# Patient Record
Sex: FEMALE | Race: ASIAN | NOT HISPANIC OR LATINO | Employment: STUDENT | URBAN - METROPOLITAN AREA
[De-identification: names, ages, dates, MRNs, and addresses within clinical notes are randomized per-mention and may not be internally consistent; named-entity substitution may affect disease eponyms.]

---

## 2018-07-12 ENCOUNTER — OFFICE VISIT - HEALTHEAST (OUTPATIENT)
Dept: FAMILY MEDICINE | Facility: CLINIC | Age: 18
End: 2018-07-12

## 2018-07-12 DIAGNOSIS — L23.2 ALLERGIC CONTACT DERMATITIS DUE TO COSMETICS: ICD-10-CM

## 2020-06-19 ENCOUNTER — OFFICE VISIT - HEALTHEAST (OUTPATIENT)
Dept: FAMILY MEDICINE | Facility: CLINIC | Age: 20
End: 2020-06-19

## 2020-06-19 DIAGNOSIS — H61.22 CERUMINOSIS, LEFT: ICD-10-CM

## 2020-06-19 DIAGNOSIS — H93.8X2 EAR CANAL MASS, LEFT: ICD-10-CM

## 2020-06-19 DIAGNOSIS — H66.002 NON-RECURRENT ACUTE SUPPURATIVE OTITIS MEDIA OF LEFT EAR WITHOUT SPONTANEOUS RUPTURE OF TYMPANIC MEMBRANE: ICD-10-CM

## 2020-06-24 ENCOUNTER — COMMUNICATION - HEALTHEAST (OUTPATIENT)
Dept: ADMINISTRATIVE | Facility: CLINIC | Age: 20
End: 2020-06-24

## 2021-06-01 VITALS — WEIGHT: 81 LBS

## 2021-06-04 VITALS
SYSTOLIC BLOOD PRESSURE: 110 MMHG | OXYGEN SATURATION: 98 % | RESPIRATION RATE: 14 BRPM | DIASTOLIC BLOOD PRESSURE: 75 MMHG | TEMPERATURE: 98.7 F | HEART RATE: 104 BPM | WEIGHT: 88.2 LBS

## 2021-06-18 NOTE — PATIENT INSTRUCTIONS - HE
Patient Instructions by Grazyna Pelaez CNP at 6/19/2020 12:00 PM     Author: Grazyna Pelaez CNP Service: -- Author Type: Nurse Practitioner    Filed: 6/19/2020 12:43 PM Encounter Date: 6/19/2020 Status: Signed    : Grazyna Pelaez CNP (Nurse Practitioner)         Patient Education     Middle Ear Infection (Adult)  You have an infection of the middle ear, the space behind the eardrum. This is also called acute otitis media (AOM). Sometimes it is caused by the common cold. This is because congestion can block the internal passage (eustachian tube) that drains fluid from the middle ear. When the middle ear fills with fluid, bacteria can grow there and cause an infection. Oral antibiotics are used to treat this illness, not ear drops. Symptoms usually start to improve within 1 to 2 days of treatment.    Home care  The following are general care guidelines:    Finish all of the antibiotic medicine given, even though you may feel better after the first few days.    You may use over-the-counter medicine, such as acetaminophen or ibuprofen, to control pain and fever, unless something else was prescribed. If you have chronic liver or kidney disease or have ever had a stomach ulcer or gastrointestinal bleeding, talk with your healthcare provider before using these medicines. Do not give aspirin to anyone under 18 years of age who has a fever. It may cause severe illness or death.  Follow-up care  Follow up with your healthcare provider, or as advised, in 2 weeks if all symptoms have not gotten better, or if hearing doesn't go back to normal within 1 month.  When to seek medical advice  Call your healthcare provider right away if any of these occur:    Ear pain gets worse or does not improve after 3 days of treatment    Unusual drowsiness or confusion    Neck pain, stiff neck, or headache    Fluid or blood draining from the ear canal    Fever of 100.4 F (38 C) or as advised     Seizure  Date Last Reviewed:  6/1/2016 2000-2017 [employer]. [address] [place] [address], [place] [address]. All rights reserved. This information is not intended as a substitute for professional medical care. Always follow your healthcare professional's instructions.           Patient Education     Impacted Earwax     Inner ear structures including ear canal and eardrum.     Impacted earwax is a buildup of the natural wax in the ear (cerumen). Impacted earwax is very common. It can cause symptoms such as hearing loss. It can also make it difficult for a doctor to examine your ear.  Understanding earwax  Tiny glands in your ear make substances that combine with dead skin cells to form earwax. Earwax helps protect your ear canal from water, dirt, infection, and injury. Over time, earwax travels from the inner part of your ear canal to the entrance of the canal. Then it falls away naturally. But in some cases, it cant travel to the entrance of the canal. This may be because of a health condition or objects put in the ear. With age, earwax tends to become harder and less fluid. Older adults are more likely to have problems with earwax buildup.  What causes impacted earwax?  Earwax can build up because of many health conditions. Some cause a physical blockage. Others cause too much earwax to be made. Health conditions that can cause earwax buildup include:    Use of cotton swabs to clean deep in the ear canal    Bony blockage in the ear (osteoma or exostoses)    Infections, such as  infection of the outer ear (external otitis)    Skin disease, such as eczema    Autoimmune diseases, such as lupus    A narrowed ear canal from birth, chronic inflammation, or injury    Too much earwax because of injury    Too much earwax because of  water in the ear canal  Objects repeatedly placed in the ear can also cause impacted earwax. For example, putting cotton swabs in the ear may push the wax deeper into the ear. Over time, this may cause blockage.  Hearing aids, swimming plugs, and swim molds can cause the same problem when used again and again.  In some cases, the cause of impacted earwax is not known.  Symptoms of impacted earwax  Excess earwax usually does not cause any symptoms, unless there is a large amount of buildup. Then it may cause symptoms such as:    Hearing loss    Earache    Sense of ear fullness    Itching in the ear    Odor from the ear    Ear drainage    Dizziness    Ringing in the ears    Cough  Treatment for impacted earwax  If you dont have symptoms, you may not need treatment. Often, the earwax goes away on its own with time. If you have symptoms, you may have one or more treatments such as:    Eardrops to soften the earwax. This helps it leave the ear over time.    Rinsing (irrigation) of the ear canal with water. This is done in a doctors office.    Removal of the earwax with small tools. This is also done in a doctors office.  In rare cases, some treatments for earwax removal may cause complications such as:    Infection of the outer ear (otitis external)    Earache    Short-term hearing loss    Dizziness    Water trapped in the ear canal    Hole in the eardrum    Ringing in the ears    Bleeding from the ear  Talk with your healthcare provider about which risks apply most to you.  Healthcare providers do not advise use of ear candles or ear vacuum kits. These methods are not shown to work and may cause problems.  Preventing impacted earwax  You may not be able to prevent impacted earwax if you have a health condition that causes it, such as eczema. In other cases, you may be able to prevent earwax buildup by:    Using ear drops once a week    Having routine cleaning of the ear about every 6 months    Not using cotton swabs in the ear  Call your healthcare provider if you have symptoms of impacted earwax. Also call right away if you have severe symptoms after earwax removal. These may include bleeding or severe ear pain.  Date Last  Reviewed: 5/1/2017 2000-2019 [id number]. [address] [place] [address], [place] [address]. All rights reserved. This information is not intended as a substitute for professional medical care. Always follow your healthcare professional's instructions.

## 2021-06-19 NOTE — PROGRESS NOTES
Subjective:      Patient ID: Genevieve Echols is a 18 y.o. female.    Chief Complaint:    HPI Genevieve Echols is a 18 y.o. female who presents today complaining of facial swelling that she woke up with this morning.  Patient believes that it is the lotion that she put on her face the night before.  She remits some itching around her eyes.      Social History   Substance Use Topics     Smoking status: Never Smoker     Smokeless tobacco: Never Used     Alcohol use None       Review of Systems   Constitutional: Negative for fever.   Skin: Positive for rash.       Objective:     /64  Pulse 94  Temp 97.4  F (36.3  C) (Oral)   Resp 14  Wt (!) 81 lb (36.7 kg)  LMP 06/20/2018 (Approximate)  SpO2 97%  Breastfeeding? No    Physical Exam   Constitutional: She appears well-developed and well-nourished. No distress.   HENT:   Head: Normocephalic and atraumatic.   Right Ear: External ear normal.   Left Ear: External ear normal.   Eyes: Conjunctivae are normal.   Pulmonary/Chest: Effort normal. No respiratory distress.   Skin: Rash noted. She is not diaphoretic.   Erythema around the maxillary region and eyes.  The erythema is mild and there is slight amount of edema.  There is no peeling or blistering of the skin.   Psychiatric: She has a normal mood and affect. Her behavior is normal. Judgment and thought content normal.   Nursing note and vitals reviewed.    Clinical Decision Making:  Suspect contact dermatitis secondary to overlay face cream.  Recommend discontinuing the use of that cream.  Patient was prescribed Zyrtec and short burst of steroid to bring down facial swelling.  No throat or lung involvement currently.  Recommend ice packs for itch relief also.    Assessment:     Procedures    1. Allergic contact dermatitis due to cosmetics  predniSONE (DELTASONE) 20 MG tablet    cetirizine (ZYRTEC) 10 MG tablet         Patient Instructions   1.  Avoid using the only face cream again.  2.  Ice the skin  to bring down swelling and help with itch relief.  3.  Begin taking Zyrtec 1 tablet daily until symptoms resolve.  4.  Begin taking prednisone 2 tablets daily for a total of 4 days.  5.  Follow-up if you are not having any improvement over the course the next 5 days.  6.  Seek emergency medical attention if you have any difficulty breathing or swallowing.

## 2021-06-29 NOTE — PROGRESS NOTES
Progress Notes by Grazyna Pelaez CNP at 6/19/2020 12:00 PM     Author: Grazyna Pelaez CNP Service: -- Author Type: Nurse Practitioner    Filed: 6/19/2020  1:36 PM Encounter Date: 6/19/2020 Status: Signed    : Grazyna Pelaez CNP (Nurse Practitioner)       Chief Complaint   Patient presents with   ? Ear Pain     Left ear pain x3 days       HPI:  Genevieve Echols is a 20 y.o. female who presents today complaining of ongoing, LEFT ear pain for the past 3 days. Patient reports intermittent rhinorrhea, otherwise no other symptoms. Patient also notes a mole in her left ear canal, requests a referral to specialist for removal evaluation. Reports taking no OTC medications for her ear pain.     History obtained from the patient.  LMP: 06/16/2020      Problem List:  There are no relevant problems documented for this patient.      History reviewed. No pertinent past medical history.    Social History     Tobacco Use   ? Smoking status: Never Smoker   ? Smokeless tobacco: Never Used   Substance Use Topics   ? Alcohol use: Not on file       Review of Systems   Constitutional: Negative for activity change, appetite change, chills, fatigue and fever.   HENT: Positive for ear pain and rhinorrhea. Negative for congestion.    Respiratory: Negative for cough, shortness of breath and wheezing.    Gastrointestinal: Negative for diarrhea.   Genitourinary: Negative for dysuria.   All other systems reviewed and are negative.      Vitals:    06/19/20 1206   BP: 110/75   Pulse: (!) 104   Resp: 14   Temp: 98.7  F (37.1  C)   TempSrc: Oral   SpO2: 98%   Weight: (!) 88 lb 3.2 oz (40 kg)       Physical Exam  Constitutional:       Appearance: Normal appearance. She is not ill-appearing or diaphoretic.   HENT:      Head: Normocephalic.      Right Ear: Tympanic membrane, ear canal and external ear normal.      Left Ear: Tympanic membrane normal.      Ears:      Comments: LEFT TM not immediately visualized due to soft  ceruminosis, Ear irrigation completed by MA with improved visualization.     LEFT TM with fluid and erythemic, canal with small, mole like mass present, non-tender.       Nose: Congestion and rhinorrhea present.      Mouth/Throat:      Mouth: Mucous membranes are moist.      Pharynx: No oropharyngeal exudate or posterior oropharyngeal erythema.   Neck:      Musculoskeletal: Neck supple.   Cardiovascular:      Rate and Rhythm: Normal rate and regular rhythm.      Pulses: Normal pulses.      Heart sounds: Normal heart sounds.   Pulmonary:      Effort: Pulmonary effort is normal.      Breath sounds: Normal breath sounds.   Lymphadenopathy:      Cervical: Cervical adenopathy present.   Skin:     General: Skin is warm.      Capillary Refill: Capillary refill takes less than 2 seconds.      Coloration: Skin is not pale.      Findings: No rash.   Neurological:      General: No focal deficit present.      Mental Status: She is alert and oriented to person, place, and time. Mental status is at baseline.   Psychiatric:         Mood and Affect: Mood normal.         Behavior: Behavior normal.         No notes on file    Labs:  No results found for this or any previous visit (from the past 72 hour(s)).    Radiology: None noted    Clinical Decision Making: At the end of the encounter, I discussed results, diagnosis, medications. Discussed with patient ear infection and need for completion of full antibiotic course and when to return to clinic for follow up if no improvement. ENT referral provided as well for ear canal mass present. Patient understood and agreed to plan.       MASTER Hollingsworth, CNP       1. Ceruminosis, left  Ear cerumen removal   2. Ear canal mass, left  Ambulatory referral to ENT   3. Non-recurrent acute suppurative otitis media of left ear without spontaneous rupture of tympanic membrane  amoxicillin (AMOXIL) 875 MG tablet         Patient Instructions       Patient Education     Middle Ear Infection  (Adult)  You have an infection of the middle ear, the space behind the eardrum. This is also called acute otitis media (AOM). Sometimes it is caused by the common cold. This is because congestion can block the internal passage (eustachian tube) that drains fluid from the middle ear. When the middle ear fills with fluid, bacteria can grow there and cause an infection. Oral antibiotics are used to treat this illness, not ear drops. Symptoms usually start to improve within 1 to 2 days of treatment.    Home care  The following are general care guidelines:    Finish all of the antibiotic medicine given, even though you may feel better after the first few days.    You may use over-the-counter medicine, such as acetaminophen or ibuprofen, to control pain and fever, unless something else was prescribed. If you have chronic liver or kidney disease or have ever had a stomach ulcer or gastrointestinal bleeding, talk with your healthcare provider before using these medicines. Do not give aspirin to anyone under 18 years of age who has a fever. It may cause severe illness or death.  Follow-up care  Follow up with your healthcare provider, or as advised, in 2 weeks if all symptoms have not gotten better, or if hearing doesn't go back to normal within 1 month.  When to seek medical advice  Call your healthcare provider right away if any of these occur:    Ear pain gets worse or does not improve after 3 days of treatment    Unusual drowsiness or confusion    Neck pain, stiff neck, or headache    Fluid or blood draining from the ear canal    Fever of 100.4 F (38 C) or as advised     Seizure  Date Last Reviewed: 6/1/2016 2000-2017 [employer]. [address] [place] [address], [place] [address]. All rights reserved. This information is not intended as a substitute for professional medical care. Always follow your healthcare professional's instructions.           Patient Education     Impacted Earwax     Inner ear structures  including ear canal and eardrum.     Impacted earwax is a buildup of the natural wax in the ear (cerumen). Impacted earwax is very common. It can cause symptoms such as hearing loss. It can also make it difficult for a doctor to examine your ear.  Understanding earwax  Tiny glands in your ear make substances that combine with dead skin cells to form earwax. Earwax helps protect your ear canal from water, dirt, infection, and injury. Over time, earwax travels from the inner part of your ear canal to the entrance of the canal. Then it falls away naturally. But in some cases, it cant travel to the entrance of the canal. This may be because of a health condition or objects put in the ear. With age, earwax tends to become harder and less fluid. Older adults are more likely to have problems with earwax buildup.  What causes impacted earwax?  Earwax can build up because of many health conditions. Some cause a physical blockage. Others cause too much earwax to be made. Health conditions that can cause earwax buildup include:    Use of cotton swabs to clean deep in the ear canal    Bony blockage in the ear (osteoma or exostoses)    Infections, such as  infection of the outer ear (external otitis)    Skin disease, such as eczema    Autoimmune diseases, such as lupus    A narrowed ear canal from birth, chronic inflammation, or injury    Too much earwax because of injury    Too much earwax because of  water in the ear canal  Objects repeatedly placed in the ear can also cause impacted earwax. For example, putting cotton swabs in the ear may push the wax deeper into the ear. Over time, this may cause blockage. Hearing aids, swimming plugs, and swim molds can cause the same problem when used again and again.  In some cases, the cause of impacted earwax is not known.  Symptoms of impacted earwax  Excess earwax usually does not cause any symptoms, unless there is a large amount of buildup. Then it may cause symptoms such  as:    Hearing loss    Earache    Sense of ear fullness    Itching in the ear    Odor from the ear    Ear drainage    Dizziness    Ringing in the ears    Cough  Treatment for impacted earwax  If you dont have symptoms, you may not need treatment. Often, the earwax goes away on its own with time. If you have symptoms, you may have one or more treatments such as:    Eardrops to soften the earwax. This helps it leave the ear over time.    Rinsing (irrigation) of the ear canal with water. This is done in a doctors office.    Removal of the earwax with small tools. This is also done in a doctors office.  In rare cases, some treatments for earwax removal may cause complications such as:    Infection of the outer ear (otitis external)    Earache    Short-term hearing loss    Dizziness    Water trapped in the ear canal    Hole in the eardrum    Ringing in the ears    Bleeding from the ear  Talk with your healthcare provider about which risks apply most to you.  Healthcare providers do not advise use of ear candles or ear vacuum kits. These methods are not shown to work and may cause problems.  Preventing impacted earwax  You may not be able to prevent impacted earwax if you have a health condition that causes it, such as eczema. In other cases, you may be able to prevent earwax buildup by:    Using ear drops once a week    Having routine cleaning of the ear about every 6 months    Not using cotton swabs in the ear  Call your healthcare provider if you have symptoms of impacted earwax. Also call right away if you have severe symptoms after earwax removal. These may include bleeding or severe ear pain.  Date Last Reviewed: 5/1/2017 2000-2019 [employer]. [address] [place] [address], [place] [address]. All rights reserved. This information is not intended as a substitute for professional medical care. Always follow your healthcare professional's instructions.

## 2021-07-13 ENCOUNTER — OFFICE VISIT (OUTPATIENT)
Dept: FAMILY MEDICINE | Facility: CLINIC | Age: 21
End: 2021-07-13
Payer: COMMERCIAL

## 2021-07-13 VITALS
OXYGEN SATURATION: 98 % | HEART RATE: 80 BPM | SYSTOLIC BLOOD PRESSURE: 100 MMHG | RESPIRATION RATE: 12 BRPM | DIASTOLIC BLOOD PRESSURE: 66 MMHG | TEMPERATURE: 98.4 F

## 2021-07-13 DIAGNOSIS — D23.22: Primary | ICD-10-CM

## 2021-07-13 PROCEDURE — 99213 OFFICE O/P EST LOW 20 MIN: CPT | Performed by: PHYSICIAN ASSISTANT

## 2021-07-13 NOTE — PATIENT INSTRUCTIONS
Patient was educated on the natural course of condition. Referred to ENT for growth in left ear canal. Conservative measures discussed including avoid using q-tips and over-the-counter analgesics (Tylenol or Ibuprofen). See your primary care provider if symptoms worsen or do not improve in 3 days. Seek emergency care if you develop severe ear pain, swelling, or redness.

## 2021-07-13 NOTE — PROGRESS NOTES
URGENT CARE VISIT:    SUBJECTIVE:   Genevieve Echols is a 21 year old female presenting with a chief complaint of left ear discharge and odor. Feels like it is blocked.  Onset was 1 week(s) ago.   She denies the following symptoms: fever, chills, stuffy nose, cough - non-productive, sore throat, nausea and vomiting  Course of illness is same.    Treatment measures tried include Tylenol/Ibuprofen with no relief of symptoms.  Predisposing factors include small mole in left ear.    PMH: History reviewed. No pertinent past medical history.  Allergies: Patient has no known allergies.   Medications:   No current outpatient medications on file.     Social History:   Social History     Tobacco Use     Smoking status: Never Smoker     Smokeless tobacco: Never Used   Substance Use Topics     Alcohol use: Not on file       ROS:  Review of systems negative except as stated above.    OBJECTIVE:  /66   Pulse 80   Temp 98.4  F (36.9  C) (Oral)   Resp 12   LMP  (LMP Unknown)   SpO2 98%   Breastfeeding No   GENERAL APPEARANCE: healthy, alert and no distress  EYES: EOMI,  PERRL, conjunctiva clear  HENT: ear canals and TM's normal.  Large growth in left external canal which is obstructing the left TM about 80%. Nose and mouth without ulcers, erythema or lesions  NECK: supple, nontender, no lymphadenopathy  RESP: lungs clear to auscultation - no rales, rhonchi or wheezes  CV: regular rates and rhythm, normal S1 S2, no murmur noted  SKIN: no suspicious lesions or rashes    ASSESSMENT:    ICD-10-CM    1. Benign tumor of ear canal, left  D23.22 Otolaryngology Referral       PLAN:  Patient Instructions   Patient was educated on the natural course of condition. Referred to ENT for growth in left ear canal. Conservative measures discussed including avoid using q-tips and over-the-counter analgesics (Tylenol or Ibuprofen). See your primary care provider if symptoms worsen or do not improve in 3 days. Seek emergency care if  you develop severe ear pain, swelling, or redness.     Patient verbalized understanding and is agreeable to plan. The patient was discharged ambulatory and in stable condition.    Raina Mcleod PA-C ....................  7/13/2021   12:44 PM

## 2021-07-23 ENCOUNTER — OFFICE VISIT (OUTPATIENT)
Dept: OTOLARYNGOLOGY | Facility: CLINIC | Age: 21
End: 2021-07-23
Attending: PHYSICIAN ASSISTANT
Payer: COMMERCIAL

## 2021-07-23 DIAGNOSIS — H93.90 EAR LESION: Primary | ICD-10-CM

## 2021-07-23 DIAGNOSIS — H74.42 AURAL POLYP, LEFT: ICD-10-CM

## 2021-07-23 PROCEDURE — 92504 EAR MICROSCOPY EXAMINATION: CPT | Performed by: OTOLARYNGOLOGY

## 2021-07-23 PROCEDURE — 99203 OFFICE O/P NEW LOW 30 MIN: CPT | Mod: 25 | Performed by: OTOLARYNGOLOGY

## 2021-07-23 RX ORDER — CIPROFLOXACIN AND DEXAMETHASONE 3; 1 MG/ML; MG/ML
4 SUSPENSION/ DROPS AURICULAR (OTIC) 2 TIMES DAILY
Qty: 7.5 ML | Refills: 0 | Status: SHIPPED | OUTPATIENT
Start: 2021-07-23 | End: 2021-07-30

## 2021-07-23 NOTE — PROGRESS NOTES
"tcCHIEF COMPLAINT:  Ear Concern      HISTORY OF PRESENT ILLNESS    Genevieve was seen at the behest of Alfaro for ear lesion.   Patient states that she has a \"mole\" in her left ear that is growing bigger and now causing some hearing loss.  No tinnitus or dizziness.  Some clear ear drainage.  No prior ear surgery.  No other ENT related concerns today.          REVIEW OF SYSTEMS    Review of Systems as per HPI and PMHx, otherwise 10 system review system are negative.     Patient has no known allergies.     LMP  (LMP Unknown)     HEAD: Normal appearance and symmetry:  No cutaneous lesions.      NECK:  supple     EARS:     Right:  WNL     Leftt:   Aural polyp filling medial canal    EAR MICROSCOPY:    There is a purplish polyp filling the EAC with keratin debris behind.   Debridement stopped due to patient discomfort.      NOSE:     Dorsum:   straight  Septum:  midline  Mucosa:  moist  Inferior turbinates:  2-3+        ORAL CAVITY/OROPHARYNX:     Lips:  Normal.  Tongue: normal, midline  Mucosa:   no lesions  Tonsils:  1+     NECK:  Trachea:  midline.              Thyroid:  normal              Adenopathy:  none        NEURO:   Alert and Oriented     GAIT AND STATION:  normal     RESPIRATORY:   Symmetry and Respiratory effort     PSYCH:  Normal mood and affect     SKIN:   warm and dry         IMPRESSION:    Encounter Diagnoses   Name Primary?     Ear lesion Yes     Aural polyp, left           RECOMMENDATIONS:    Ciprodex drops as directed  CT IACs   Return visit 3 weeks with hearing test.     "

## 2021-07-23 NOTE — LETTER
"    7/23/2021         RE: Genevieve Echols  [address] [facility] [id number]        Dear Colleague,    Thank you for referring your patient, Genevieve Echols, to the [facility]. Please see a copy of my visit note below.    tcCHIEF COMPLAINT:  Ear Concern      HISTORY OF PRESENT ILLNESS    Genevieve was seen at the behest of Alfaro for ear lesion.   Patient states that she has a \"mole\" in her left ear that is growing bigger and now causing some hearing loss.  No tinnitus or dizziness.  Some clear ear drainage.  No prior ear surgery.  No other ENT related concerns today.          REVIEW OF SYSTEMS    Review of Systems as per HPI and PMHx, otherwise 10 system review system are negative.     Patient has no known allergies.     LMP  (LMP Unknown)     HEAD: Normal appearance and symmetry:  No cutaneous lesions.      NECK:  supple     EARS:     Right:  WNL     Leftt:   Aural polyp filling medial canal    EAR MICROSCOPY:    There is a purplish polyp filling the EAC with keratin debris behind.   Debridement stopped due to patient discomfort.      NOSE:     Dorsum:   straight  Septum:  midline  Mucosa:  moist  Inferior turbinates:  2-3+        ORAL CAVITY/OROPHARYNX:     Lips:  Normal.  Tongue: normal, midline  Mucosa:   no lesions  Tonsils:  1+     NECK:  Trachea:  midline.              Thyroid:  normal              Adenopathy:  none        NEURO:   Alert and Oriented     GAIT AND STATION:  normal     RESPIRATORY:   Symmetry and Respiratory effort     PSYCH:  Normal mood and affect     SKIN:   warm and dry         IMPRESSION:    Encounter Diagnoses   Name Primary?     Ear lesion Yes     Aural polyp, left           RECOMMENDATIONS:    Ciprodex drops as directed  CT IACs   Return visit 3 weeks with hearing test.         Again, thank you for allowing me to participate in the care of your patient.        Sincerely,        Serge Isaac MD    "

## 2021-07-31 ENCOUNTER — HOSPITAL ENCOUNTER (OUTPATIENT)
Dept: CT IMAGING | Facility: HOSPITAL | Age: 21
Discharge: HOME | End: 2021-07-31
Attending: OTOLARYNGOLOGY | Admitting: OTOLARYNGOLOGY
Payer: COMMERCIAL

## 2021-07-31 DIAGNOSIS — H74.42 AURAL POLYP, LEFT: ICD-10-CM

## 2021-07-31 PROCEDURE — 70480 CT ORBIT/EAR/FOSSA W/O DYE: CPT

## 2021-10-01 ENCOUNTER — OFFICE VISIT (OUTPATIENT)
Dept: OTOLARYNGOLOGY | Facility: CLINIC | Age: 21
End: 2021-10-01
Payer: COMMERCIAL

## 2021-10-01 ENCOUNTER — OFFICE VISIT (OUTPATIENT)
Dept: AUDIOLOGY | Facility: CLINIC | Age: 21
End: 2021-10-01
Payer: COMMERCIAL

## 2021-10-01 DIAGNOSIS — H74.42 AURAL POLYP, LEFT: Primary | ICD-10-CM

## 2021-10-01 DIAGNOSIS — H90.12 CONDUCTIVE HEARING LOSS IN LEFT EAR: Primary | ICD-10-CM

## 2021-10-01 PROCEDURE — 99213 OFFICE O/P EST LOW 20 MIN: CPT | Mod: 25 | Performed by: OTOLARYNGOLOGY

## 2021-10-01 PROCEDURE — 92557 COMPREHENSIVE HEARING TEST: CPT | Performed by: AUDIOLOGIST

## 2021-10-01 PROCEDURE — 87070 CULTURE OTHR SPECIMN AEROBIC: CPT | Performed by: OTOLARYNGOLOGY

## 2021-10-01 PROCEDURE — 87205 SMEAR GRAM STAIN: CPT | Performed by: OTOLARYNGOLOGY

## 2021-10-01 PROCEDURE — 87186 SC STD MICRODIL/AGAR DIL: CPT | Performed by: OTOLARYNGOLOGY

## 2021-10-01 PROCEDURE — 99207 PR NO CHARGE LOS: CPT | Performed by: AUDIOLOGIST

## 2021-10-01 PROCEDURE — 92567 TYMPANOMETRY: CPT | Performed by: AUDIOLOGIST

## 2021-10-01 PROCEDURE — 92504 EAR MICROSCOPY EXAMINATION: CPT | Performed by: OTOLARYNGOLOGY

## 2021-10-01 PROCEDURE — 87077 CULTURE AEROBIC IDENTIFY: CPT | Performed by: OTOLARYNGOLOGY

## 2021-10-01 RX ORDER — CIPROFLOXACIN AND DEXAMETHASONE 3; 1 MG/ML; MG/ML
4 SUSPENSION/ DROPS AURICULAR (OTIC) 2 TIMES DAILY
Qty: 2.8 ML | Refills: 0 | Status: SHIPPED | OUTPATIENT
Start: 2021-10-01 | End: 2021-10-08

## 2021-10-01 NOTE — PROGRESS NOTES
AUDIOLOGY REPORT    SUMMARY: Audiology visit completed. See audiogram for results.      RECOMMENDATIONS: Follow-up with ENT.    Nargis Jose, CCC-A  [place] Licensed Audiologist #[id number]

## 2021-10-01 NOTE — PROGRESS NOTES
CHIEF COMPLAINT:  Recheck      HISTORY OF PRESENT ILLNESS    Genevieve was seen in follow up for audiogram review after treatment for left aural polyp.     CT temporal bones:    IMPRESSION:  1.  4.6 x 5.1 mm polypoid exophytic focus along the superior aspect of the left external auditory canal. There is no associated erosive changes. It has nonaggressive-appearing features. Recommend correlation with direct visualization and if needed tissue   sampling.  2.  Mild thickening of the left tympanic membrane with suggestion of perforation in the mid and tympanic membrane.  3.  Small amount of soft tissue within the left Prussak's space. No convincing erosive changes involving the bony ossicles. These are likely on inflammatory basis given the lack of osseous erosive changes which are typically seen in cholesteatoma.  4.  The right temporal bone is within normal limits.      Ciprodex drops as directed  CT IACs   Return visit 3 weeks with hearing test.        REVIEW OF SYSTEMS    Review of Systems: a 10-system review is reviewed at this encounter.  See scanned document.     Patient has no known allergies.     PHYSICAL EXAM:        HEAD: Normal appearance and symmetry:  No cutaneous lesions.      EARS:   Auricles normal    MICROSCOPE EXAM    Brownish-red polyp filling most of lateral EAC.  Culture obtained.  Ciprodex/gelfoam placed in medial EAC.      NOSE:    Dorsum:   straight       ORAL CAVITY/OROPHARYNX:    Lips:  Normal.     NECK:  Trachea:  midline       NEURO:   Alert and Oriented    GAIT AND STATION:  normal     RESPIRATORY:   Symmetry and Respiratory effort    PSYCH:   normal mood and affect    SKIN:  warm and dry         IMPRESSION:   Encounter Diagnosis   Name Primary?     Aural polyp, left Yes              RECOMMENDATIONS:    No orders of the defined types were placed in this encounter.     Orders Placed This Encounter   Medications     ciprofloxacin-dexamethasone (CIPRODEX) 0.3-0.1 % otic suspension     Sig: Place  4 drops Into the left ear 2 times daily for 7 days     Dispense:  2.8 mL     Refill:  0      MRI brain  RTC 3 weeks

## 2021-10-05 LAB
BACTERIA SPEC CULT: ABNORMAL
BACTERIA SPEC CULT: ABNORMAL
GRAM STAIN RESULT: ABNORMAL
GRAM STAIN RESULT: ABNORMAL

## 2021-11-01 ENCOUNTER — HOSPITAL ENCOUNTER (OUTPATIENT)
Dept: MRI IMAGING | Facility: HOSPITAL | Age: 21
Discharge: HOME | End: 2021-11-01
Attending: OTOLARYNGOLOGY | Admitting: OTOLARYNGOLOGY
Payer: COMMERCIAL

## 2021-11-01 DIAGNOSIS — H74.42 AURAL POLYP, LEFT: ICD-10-CM

## 2021-11-01 PROCEDURE — A9585 GADOBUTROL INJECTION: HCPCS | Performed by: OTOLARYNGOLOGY

## 2021-11-01 PROCEDURE — 255N000002 HC RX 255 OP 636: Performed by: OTOLARYNGOLOGY

## 2021-11-01 PROCEDURE — 70553 MRI BRAIN STEM W/O & W/DYE: CPT

## 2021-11-01 RX ORDER — GADOBUTROL 604.72 MG/ML
4 INJECTION INTRAVENOUS ONCE
Status: COMPLETED | OUTPATIENT
Start: 2021-11-01 | End: 2021-11-01

## 2021-11-01 RX ADMIN — GADOBUTROL 4 ML: 604.72 INJECTION INTRAVENOUS at 14:43

## 2021-11-02 DIAGNOSIS — R93.0 ABNORMAL MRI OF HEAD: Primary | ICD-10-CM

## 2021-11-03 ENCOUNTER — TELEPHONE (OUTPATIENT)
Dept: OTOLARYNGOLOGY | Facility: CLINIC | Age: 21
End: 2021-11-03

## 2021-11-03 NOTE — TELEPHONE ENCOUNTER
Second attempt to try to discuss MRI results with patient.  Patient is left message voicemail message for her to call the clinic at [phone]

## 2021-11-04 ENCOUNTER — TELEPHONE (OUTPATIENT)
Dept: OTOLARYNGOLOGY | Facility: CLINIC | Age: 21
End: 2021-11-04

## 2021-11-04 NOTE — TELEPHONE ENCOUNTER
----- Message from Serge Isaac MD sent at 11/4/2021 11:12 AM CDT -----  Can you tell her recent MRI showed a small spot beneath her brain and we need to do some additional tests to better understand what it is.    ----- Message -----  From: Daisy Cole RN  Sent: 11/4/2021  10:56 AM CDT  To: Serge Isaac MD    She called back and left a message. Do you want to try calling her again or what do you want me to tell her?    Daisy Lemus   ----- Message -----  From: Serge Isaac MD  Sent: 11/2/2021   2:16 PM CDT  To: Daisy Cole RN, Serge Isaac MD    Left voicemail message for patient to contact me at [phone] for MRI results.

## 2021-11-04 NOTE — TELEPHONE ENCOUNTER
Patient notified of results and she is scheduled for more imaging on 11/29. Patient expressed understanding.         [facility] [place]      Daisy Cole RN  [facility] [place]  ENT  [address] [address]  [email or web address]   Office:[phone]  Employed by [facility],

## 2021-12-12 ENCOUNTER — HEALTH MAINTENANCE LETTER (OUTPATIENT)
Age: 21
End: 2021-12-12

## 2021-12-17 ENCOUNTER — HOSPITAL ENCOUNTER (OUTPATIENT)
Dept: MRI IMAGING | Facility: CLINIC | Age: 21
End: 2021-12-17
Attending: OTOLARYNGOLOGY
Payer: COMMERCIAL

## 2021-12-17 DIAGNOSIS — R93.0 ABNORMAL MRI OF HEAD: ICD-10-CM

## 2021-12-17 PROCEDURE — 70553 MRI BRAIN STEM W/O & W/DYE: CPT

## 2021-12-17 PROCEDURE — 255N000002 HC RX 255 OP 636: Performed by: OTOLARYNGOLOGY

## 2021-12-17 PROCEDURE — A9585 GADOBUTROL INJECTION: HCPCS | Performed by: OTOLARYNGOLOGY

## 2021-12-17 PROCEDURE — 70544 MR ANGIOGRAPHY HEAD W/O DYE: CPT | Mod: XU

## 2021-12-17 RX ORDER — GADOBUTROL 604.72 MG/ML
4 INJECTION INTRAVENOUS ONCE
Status: COMPLETED | OUTPATIENT
Start: 2021-12-17 | End: 2021-12-17

## 2021-12-17 RX ADMIN — GADOBUTROL 4 ML: 604.72 INJECTION INTRAVENOUS at 21:02

## 2022-10-01 ENCOUNTER — HEALTH MAINTENANCE LETTER (OUTPATIENT)
Age: 22
End: 2022-10-01

## 2023-02-05 ENCOUNTER — HEALTH MAINTENANCE LETTER (OUTPATIENT)
Age: 23
End: 2023-02-05

## 2024-03-03 ENCOUNTER — HEALTH MAINTENANCE LETTER (OUTPATIENT)
Age: 24
End: 2024-03-03

## 2025-01-14 ENCOUNTER — TELEPHONE (OUTPATIENT)
Dept: FAMILY MEDICINE | Facility: CLINIC | Age: 25
End: 2025-01-14
Payer: COMMERCIAL

## 2025-01-14 NOTE — TELEPHONE ENCOUNTER
Contacted patient with  Services [facility] [place] [id number].  Per patient does not need .  Relayed that Provider Nupur THAO CNP is out of office and need to reschedule 01/15/25 appointment. Patient declined appointments on 01/15/25 at [facility].  Patient asked for afternoon appointments with female providers on 01/31/25 declined all offered.  Patient asked for 01/20/25 none available.  Patient then asked for 01/31/25 morning appointments.    Scheduled at [facility] 01/31/25 with Dr. Casey

## 2025-05-05 ENCOUNTER — OFFICE VISIT (OUTPATIENT)
Dept: URGENT CARE | Facility: URGENT CARE | Age: 25
End: 2025-05-05
Payer: COMMERCIAL

## 2025-05-05 VITALS
TEMPERATURE: 98.2 F | HEART RATE: 98 BPM | DIASTOLIC BLOOD PRESSURE: 77 MMHG | SYSTOLIC BLOOD PRESSURE: 116 MMHG | WEIGHT: 95.3 LBS | OXYGEN SATURATION: 99 % | HEIGHT: 60 IN | BODY MASS INDEX: 18.71 KG/M2 | RESPIRATION RATE: 19 BRPM

## 2025-05-05 DIAGNOSIS — J06.9 VIRAL UPPER RESPIRATORY TRACT INFECTION: Primary | ICD-10-CM

## 2025-05-05 LAB
DEPRECATED S PYO AG THROAT QL EIA: NEGATIVE
FLUAV AG SPEC QL IA: NEGATIVE
FLUBV AG SPEC QL IA: NEGATIVE
S PYO DNA THROAT QL NAA+PROBE: NOT DETECTED

## 2025-05-05 PROCEDURE — 87804 INFLUENZA ASSAY W/OPTIC: CPT

## 2025-05-05 PROCEDURE — 87635 SARS-COV-2 COVID-19 AMP PRB: CPT

## 2025-05-05 PROCEDURE — 3074F SYST BP LT 130 MM HG: CPT

## 2025-05-05 PROCEDURE — 87651 STREP A DNA AMP PROBE: CPT

## 2025-05-05 PROCEDURE — 3078F DIAST BP <80 MM HG: CPT

## 2025-05-05 PROCEDURE — 99203 OFFICE O/P NEW LOW 30 MIN: CPT

## 2025-05-05 NOTE — PROGRESS NOTES
Assessment & Plan     Lower respiratory tract infection  - Influenza A/B antigen  - COVID-19 Virus (Coronavirus) by PCR Nose  - Streptococcus A Rapid Screen w/Reflex to PCR - Clinic Collect     Viral testing negative. Likely other viral infection. Considered pneumonia however lung exam clear. Discussed with patient who verbalized understanding. Return precautions discussed. All questions and concerns addressed.      Return for Follow up with your PCP in 1 week if symptoms continue or worsen..    Katherine Hamilton MD  [facility] RICARDO Vallejo is a 25 year old female who presents to clinic today for the following health issues:  Chief Complaint   Patient presents with    URI     X 3 weeks, cough, sore throat, denies fever. Denies chest pain. Mild congestion          5/5/2025     5:35 PM   Additional Questions   Roomed by riya   Accompanied by self     HPI    Two weeks ago, had cold symptoms - never fully went away but was getting better and then three days ago symptoms worsened. Endorses sore throat, cough, chills, full body aches. The cough is not productive. No fevers, nausea or vomiting. No abdominal pain. Denies difficulty breathing. Tried taking OTC cough/cold medication that did not seem to help. No sick contacts. Did have recent travel to [place], returned on Apr 10th. No symptoms upon returning.    Review of Systems  Full ROS otherwise negative except as stated above.        Objective    /77 (BP Location: Right arm, Patient Position: Sitting, Cuff Size: Adult Regular)   Pulse 98   Temp 98.2  F (36.8  C) (Oral)   Resp 19   Ht 1.524 m (5')   Wt 43.2 kg (95 lb 4.8 oz)   LMP 04/17/2025 (Exact Date)   SpO2 99%   BMI 18.61 kg/m    Physical Exam   GENERAL: alert and no distress  HENT: congested, MMM, EOMI  RESP: lungs clear to auscultation - no rales, rhonchi or wheezes  CV: regular rate and rhythm, normal S1 S2, no S3 or S4, no murmur, click or rub, no  peripheral edema  ABDOMEN: soft, nontender, no hepatosplenomegaly, no masses and bowel sounds normal  MS: no gross musculoskeletal defects noted, no edema    Results for orders placed or performed in visit on 05/05/25 (from the past 24 hours)   Influenza A/B antigen    Specimen: Nose; Swab   Result Value Ref Range    Influenza A antigen Negative Negative    Influenza B antigen Negative Negative    Narrative    Test results must be correlated with clinical data. If necessary, results should be confirmed by a molecular assay or viral culture.   Streptococcus A Rapid Screen w/Reflex to PCR - Clinic Collect    Specimen: Throat; Swab   Result Value Ref Range    Group A Strep antigen Negative Negative

## 2025-05-05 NOTE — PATIENT INSTRUCTIONS
- Flu, COVID, and Strep test were negative.   - try taking tylenol or ibuprofen as needed for body aches  - try taking a spoonful of honey to help with cough  - drink plenty of fluids and get plenty of rest as able  - if symptoms worsen or are not improving please see your PCP in 1 week.

## 2025-05-06 LAB — SARS-COV-2 RNA RESP QL NAA+PROBE: NEGATIVE

## 2025-05-18 ENCOUNTER — HEALTH MAINTENANCE LETTER (OUTPATIENT)
Age: 25
End: 2025-05-18